# Patient Record
Sex: FEMALE | Race: WHITE
[De-identification: names, ages, dates, MRNs, and addresses within clinical notes are randomized per-mention and may not be internally consistent; named-entity substitution may affect disease eponyms.]

---

## 2020-01-26 ENCOUNTER — HOSPITAL ENCOUNTER (EMERGENCY)
Dept: HOSPITAL 97 - ER | Age: 65
Discharge: HOME | End: 2020-01-26
Payer: SELF-PAY

## 2020-01-26 VITALS — SYSTOLIC BLOOD PRESSURE: 148 MMHG | DIASTOLIC BLOOD PRESSURE: 76 MMHG | OXYGEN SATURATION: 95 %

## 2020-01-26 VITALS — TEMPERATURE: 97.8 F

## 2020-01-26 DIAGNOSIS — Z88.6: ICD-10-CM

## 2020-01-26 DIAGNOSIS — Z88.7: ICD-10-CM

## 2020-01-26 DIAGNOSIS — Z88.0: ICD-10-CM

## 2020-01-26 DIAGNOSIS — Z88.1: ICD-10-CM

## 2020-01-26 DIAGNOSIS — K08.89: Primary | ICD-10-CM

## 2020-01-26 PROCEDURE — 99283 EMERGENCY DEPT VISIT LOW MDM: CPT

## 2020-01-26 PROCEDURE — 96372 THER/PROPH/DIAG INJ SC/IM: CPT

## 2020-01-26 NOTE — EDPHYS
Physician Documentation                                                                           

 [facility]                                                                 

Name: Kina Shannon                                                                           

Age: 64 yrs                                                                                       

Sex: Female                                                                                       

: 1955                                                                                   

MRN: [id number]                                                                                   

Arrival Date: 2020                                                                          

Time: 19:11                                                                                       

Account#: [id number]                                                                            

Bed 25                                                                                            

Private MD:                                                                                       

ED Physician Endy Gillis                                                                         

HPI:                                                                                              

                                                                                             

20:16 This 64 yrs old  Female presents to ER via Ambulatory with complaints of       pm1 

      Toothache.                                                                                  

20:16 The patient presents with pain. The problem is located in the lower right first molar   pm1 

      and lower right second molar. Onset: The symptoms/episode began/occurred today.             

      Duration: The symptoms are continuous. Modifying factors: The symptoms are alleviated       

      by nothing, the symptoms are aggravated by nothing. Associated signs and symptoms:          

      Pertinent negatives: dysphagia, fever, swelling. Severity of symptoms: in the emergency     

      department the symptoms are actually worse. patient reports pain to area of bridge to       

      right lower molars. Contacted her dentist and was referred to the the ER.                   

                                                                                                  

Historical:                                                                                       

- Allergies:                                                                                      

19:31 PENICILLINS;                                                                            rv  

19:31 Codeine;                                                                                rv  

19:31 Rifampin;                                                                               rv  

19:31 Talwin;                                                                                 rv  

19:31 Tetanus Vaccines \T\ Toxoid;                                                              rv

19:31 Macrobid;                                                                               rv  

- PMHx:                                                                                           

19:31 Hypertension; Thyroid problem; High Cholesterol;                                        rv  

- PSHx:                                                                                           

19:31 ; Cholecystectomy;                                                             rv  

                                                                                                  

- Immunization history:: Adult Immunizations up to date.                                          

- Coronavirus screen:: The patient has NOT traveled to [place], [place], or [place] in the           

  past 14 days. Proceed with normal triage process as indicated. The patient has NOT              

  had contact with known/suspected case of Coronavirus? Proceed with normal triage                

  procedures.                                                                                     

- Social history:: Smoking status: Patient reports the use of cigarette tobacco                   

  products, denies chronic smoking, but will smoke occasionally.                                  

- Ebola Screening: : No symptoms or risks identified at this time.                                

                                                                                                  

                                                                                                  

ROS:                                                                                              

20:16 Constitutional: Negative for fever, chills, and weight loss, Eyes: Negative for injury, pm1 

      pain, redness, and discharge.                                                               

20:16 Neck: Negative for injury, pain, and swelling, Cardiovascular: Negative for chest pain,     

      palpitations, and edema, Respiratory: Negative for shortness of breath, cough,              

      wheezing, and pleuritic chest pain, Abdomen/GI: Negative for abdominal pain, nausea,        

      vomiting, diarrhea, and constipation, Neuro: Negative for headache, weakness, numbness,     

      tingling, and seizure.                                                                      

20:16 ENT: Positive for dental pain, Negative for ear pain, difficulty swallowing, difficulty     

      handling secretions, hoarseness.                                                            

20:16 All other systems are negative.                                                             

                                                                                                  

Exam:                                                                                             

20:16 Constitutional:  This is a well developed, well nourished patient who is awake, alert,  pm1 

      and in no acute distress. Head/Face:  Normocephalic, atraumatic. Eyes:  Pupils equal        

      round and reactive to light, extra-ocular motions intact.  Lids and lashes normal.          

      Conjunctiva and sclera are non-icteric and not injected.  Cornea within normal limits.      

      Periorbital areas with no swelling, redness, or edema.                                      

20:16 Neck:  Trachea midline, no thyromegaly or masses palpated, and no cervical                  

      lymphadenopathy.  Supple, full range of motion without nuchal rigidity, or vertebral        

      point tenderness.  No Meningismus. Chest/axilla:  Normal chest wall appearance and          

      motion.  Nontender with no deformity.  No lesions are appreciated. Cardiovascular:          

      Regular rate and rhythm with a normal S1 and S2.  No gallops, murmurs, or rubs.  Normal     

      PMI, no JVD.  No pulse deficits. Respiratory:  Lungs have equal breath sounds               

      bilaterally, clear to auscultation and percussion.  No rales, rhonchi or wheezes noted.     

       No increased work of breathing, no retractions or nasal flaring. Back:  No spinal          

      tenderness.  No costovertebral tenderness.  Full range of motion. Skin:  Warm, dry with     

      normal turgor.  Normal color with no rashes, no lesions, and no evidence of cellulitis.     

20:16 ENT: External ear(s): are unremarkable, Ear canal(s): are normal, TM's: are normal,         

      Dental exam: pain, that is moderate, specifically in the  lower right first molar (#30)     

      and lower right second molar (#31).                                                         

20:16 Neuro: Orientation: is normal, Motor: is normal, moves all fours.                           

                                                                                                  

Vital Signs:                                                                                      

19:29  / 80; Pulse 69; Resp 16; Temp 97.8; Pulse Ox 100% ; Weight 72.57 kg; Pain 10/10; rv  

21:30  / 76; Pulse 68; Resp 18; Pulse Ox 95% on R/A;                                    aj1 

                                                                                                  

MDM:                                                                                              

19:43 Patient medically screened.                                                             pm1 

21:13 Data reviewed: vital signs. Data interpreted: Pulse oximetry: on room air is 100 %.     pm1 

      Interpretation: normal. Counseling: I had a detailed discussion with the patient and/or     

      guardian regarding: the historical points, exam findings, and any diagnostic results        

      supporting the discharge/admit diagnosis, the need for outpatient follow up, for            

      definitive care, a dentist, to return to the emergency department if symptoms worsen or     

      persist or if there are any questions or concerns that arise at home.                       

21:13 ED course: Patient pain 6/10. Patient feels comfortable going home with pain relief.    pm1 

                                                                                                  

Administered Medications:                                                                         

19:52 Drug: morphine 4 mg {Note: RASS 1.} Route: IM; Site: left deltoid;                      rv  

21:29 Follow up: Response: No adverse reaction                                                aj1 

19:53 Drug: Zofran 4 mg Route: PO;                                                            rv  

21:29 Follow up: Response: No adverse reaction                                                aj1 

20:45 Drug: fentaNYL (PF) 50 mcg {Note: rass 0.} Route: IM; Site: right deltoid;              rv  

21:29 Follow up: Response: No adverse reaction; Pain is decreased; RASS: Alert and Calm (0)   aj 

                                                                                                  

                                                                                                  

Disposition:                                                                                      

21:33 Co-signature as Attending Physician, Endy Gillis MD.                                    rn  

                                                                                                  

Disposition:                                                                                      

20 21:16 Discharged to Home. Impression: Dental pain.                                       

- Condition is Stable.                                                                            

- Discharge Instructions: Dental Pain.                                                            

- Prescriptions for Clindamycin HCl 300 mg Oral Capsule - take 1 capsule by ORAL route            

  every 6 hours for 10 days; 40 capsule.                                                          

- Medication Reconciliation Form, Thank You Letter, Antibiotic Education, Prescription            

  Opioid Use form.                                                                                

- Follow up: Emergency Department; When: As needed; Reason: Worsening of condition.               

  Follow up: Private Physician; When: 2 - 3 days; Reason: Recheck today's complaints,             

  Continuance of care, Re-evaluation by your physician.                                           

- Problem is new.                                                                                 

- Symptoms have improved.                                                                         

                                                                                                  

                                                                                                  

                                                                                                  

Signatures:                                                                                       

Marleen Rutherford RN                     RN   aj1                                                  

Endy Gillis MD MD   rn                                                   

Foster Salguero, LISBETH                    NP   pm1                                                  

Avery Valenzuela, ANDREW                    RN   rv                                                   

                                                                                                  

Corrections: (The following items were deleted from the chart)                                    

21:31 21:16 2020 21:16 Discharged to Home. Impression: Dental pain. Condition is        aj1 

      Stable. Forms are Medication Reconciliation Form, Thank You Letter, Antibiotic              

      Education, Prescription Opioid Use. Follow up: Emergency Department; When: As needed;       

      Reason: Worsening of condition. Follow up: Private Physician; When: 2 - 3 days; Reason:     

      Recheck today's complaints, Continuance of care, Re-evaluation by your physician.           

      Problem is new. Symptoms have improved. pm1                                                 

                                                                                                  

**************************************************************************************************

## 2020-01-26 NOTE — ER
Nurse's Notes                                                                                     

 [facility]                                                                 

Name: Kina Shannon                                                                           

Age: 64 yrs                                                                                       

Sex: Female                                                                                       

: 1955                                                                                   

MRN: [id number]                                                                                   

Arrival Date: 2020                                                                          

Time: 19:11                                                                                       

Account#: [id number]                                                                            

Bed 25                                                                                            

Private MD:                                                                                       

Diagnosis: Dental pain                                                                            

                                                                                                  

Presentation:                                                                                     

                                                                                             

19:25 Presenting complaint: Patient states: PAIN STARTED TO GET BAD THIS AFTERNOON. SHE TOOK  rv  

      TRAMADOL, WHICH IS FOR HER ARTHRITIS, NOT HELPING WITH THE TOOTHACHE. ALSO TOOK             

      LIDOCAINE GELS. Transition of care: patient was not received from another setting of        

      care. Onset of symptoms was 2020 at 12:00. Risk Assessment: Do you want to      

      hurt yourself or someone else? Patient reports no desire to harm self or others.            

      Initial Sepsis Screen: Does the patient meet any 2 criteria? No. Patient's initial          

      sepsis screen is negative. Does the patient have a suspected source of infection? No.       

      Patient's initial sepsis screen is negative. Care prior to arrival: None.                   

19:25 Method Of Arrival: Ambulatory                                                           rv  

19:25 Acuity: NESHA 4                                                                           rv  

                                                                                                  

Historical:                                                                                       

- Allergies:                                                                                      

19:31 PENICILLINS;                                                                            rv  

19:31 Codeine;                                                                                rv  

19:31 Rifampin;                                                                               rv  

19:31 Talwin;                                                                                 rv  

19:31 Tetanus Vaccines \T\ Toxoid;                                                              rv

19:31 Macrobid;                                                                               rv  

- PMHx:                                                                                           

19:31 Hypertension; Thyroid problem; High Cholesterol;                                        rv  

- PSHx:                                                                                           

19:31 ; Cholecystectomy;                                                             rv  

                                                                                                  

- Immunization history:: Adult Immunizations up to date.                                          

- Coronavirus screen:: The patient has NOT traveled to [place], [place], or [place] in the           

  past 14 days. Proceed with normal triage process as indicated. The patient has NOT              

  had contact with known/suspected case of Coronavirus? Proceed with normal triage                

  procedures.                                                                                     

- Social history:: Smoking status: Patient reports the use of cigarette tobacco                   

  products, denies chronic smoking, but will smoke occasionally.                                  

- Ebola Screening: : No symptoms or risks identified at this time.                                

                                                                                                  

                                                                                                  

Screenin:55 Abuse screen: Denies threats or abuse. Denies injuries from another. Nutritional        aj1 

      screening: No deficits noted. Tuberculosis screening: No symptoms or risk factors           

      identified.                                                                                 

21:31 Fall Risk None identified.                                                              aj1 

                                                                                                  

Assessment:                                                                                       

20:53 Reassessment: Received patient, states that she got really nauseous after receiving     aj1 

      Fentanyl, states that she is feeling better at this time. Patient is laying in bed          

      comfortably.                                                                                

20:55 General: Appears in no apparent distress. comfortable, Behavior is calm, cooperative,   aj1 

      appropriate for age. Pain: Complains of pain in right jaw. Neuro: Level of                  

      Consciousness is awake, alert, obeys commands. Cardiovascular: Patient's skin is warm       

      and dry. Respiratory: Airway is patent Respiratory effort is even, unlabored,               

      Respiratory pattern is regular, symmetrical. GI: No signs and/or symptoms were reported     

      involving the gastrointestinal system. : No signs and/or symptoms were reported           

      regarding the genitourinary system. EENT: Reports jaw pain. Derm: No signs and/or           

      symptoms reported regarding the dermatologic system. Skin is pink, warm \T\ dry. normal.    

      Musculoskeletal: No signs and/or symptoms reported regarding the musculoskeletal            

      system. Circulation, motion, and sensation intact.                                          

21:30 Reassessment: Patient appears in no apparent distress at this time. No changes from     aj1 

      previously documented assessment. Patient and/or family updated on plan of care and         

      expected duration. Pain level reassessed. Patient is alert, oriented x 3, equal             

      unlabored respirations, skin warm/dry/pink.                                                 

                                                                                                  

Vital Signs:                                                                                      

19:29  / 80; Pulse 69; Resp 16; Temp 97.8; Pulse Ox 100% ; Weight 72.57 kg; Pain 10/10; rv  

21:30  / 76; Pulse 68; Resp 18; Pulse Ox 95% on R/A;                                    aj1 

                                                                                                  

ED Course:                                                                                        

19:11 Patient arrived in ED.                                                                  as  

19:29 Triage completed.                                                                       rv  

19:31 Arm band placed on.                                                                     rv  

19:36 Foster aSlguero NP is PHCP.                                                           pm1 

19:36 Endy Gillis MD is Attending Physician.                                                pm1 

19:52 Avery Valenzuela RN is Primary Nurse.                                                  rv  

20:55 Patient has correct armband on for positive identification. Bed in low position. Call   aj1 

      light in reach. Side rails up X 1.                                                          

20:55 No provider procedures requiring assistance completed.                                  aj1 

21:30 Patient did not have IV access during this emergency room visit.                        aj1 

                                                                                                  

Administered Medications:                                                                         

19:52 Drug: morphine 4 mg {Note: RASS 1.} Route: IM; Site: left deltoid;                      rv  

21:29 Follow up: Response: No adverse reaction                                                aj1 

19:53 Drug: Zofran 4 mg Route: PO;                                                            rv  

21:29 Follow up: Response: No adverse reaction                                                aj1 

20:45 Drug: fentaNYL (PF) 50 mcg {Note: rass 0.} Route: IM; Site: right deltoid;              rv  

21:29 Follow up: Response: No adverse reaction; Pain is decreased; RASS: Alert and Calm (0)   aj1 

                                                                                                  

                                                                                                  

Outcome:                                                                                          

21:16 Discharge ordered by MD.                                                                pm1 

21:30 Discharged to home ambulatory, with family.                                             aj1 

21:30 Condition: good                                                                             

21:30 Discharge instructions given to patient, Instructed on discharge instructions, follow       

      up and referral plans. medication usage, Demonstrated understanding of instructions,        

      follow-up care, medications, Prescriptions given X 1.                                       

21:31 Patient left the ED.                                                                    aj1 

                                                                                                  

Signatures:                                                                                       

Marleen Rutherford, RN                     RN   aj1                                                  

Nikki Salazar Patrick, LISBETH                    NP   pm1                                                  

Avery Valenzuela RN                    RN   rv                                                   

                                                                                                  

**************************************************************************************************

## 2022-08-04 ENCOUNTER — HOSPITAL ENCOUNTER (EMERGENCY)
Dept: HOSPITAL 97 - ER | Age: 67
Discharge: HOME | End: 2022-08-04
Payer: COMMERCIAL

## 2022-08-04 VITALS — DIASTOLIC BLOOD PRESSURE: 82 MMHG | SYSTOLIC BLOOD PRESSURE: 184 MMHG

## 2022-08-04 VITALS — TEMPERATURE: 98.3 F

## 2022-08-04 VITALS — OXYGEN SATURATION: 97 %

## 2022-08-04 DIAGNOSIS — M54.2: ICD-10-CM

## 2022-08-04 DIAGNOSIS — E78.00: ICD-10-CM

## 2022-08-04 DIAGNOSIS — F17.210: ICD-10-CM

## 2022-08-04 DIAGNOSIS — I10: ICD-10-CM

## 2022-08-04 DIAGNOSIS — Z88.7: ICD-10-CM

## 2022-08-04 DIAGNOSIS — Z88.1: ICD-10-CM

## 2022-08-04 DIAGNOSIS — M25.512: Primary | ICD-10-CM

## 2022-08-04 DIAGNOSIS — Z88.5: ICD-10-CM

## 2022-08-04 LAB
BUN BLD-MCNC: 13 MG/DL (ref 7–18)
GLUCOSE SERPLBLD-MCNC: 101 MG/DL (ref 74–106)
HCT VFR BLD CALC: 41.7 % (ref 36–45)
LYMPHOCYTES # SPEC AUTO: 2.8 K/UL (ref 0.7–4.9)
MCV RBC: 80.6 FL (ref 80–100)
PMV BLD: 7.5 FL (ref 7.6–11.3)
POTASSIUM SERPL-SCNC: 3.6 MMOL/L (ref 3.5–5.1)
RBC # BLD: 5.17 M/UL (ref 3.86–4.86)

## 2022-08-04 PROCEDURE — 73030 X-RAY EXAM OF SHOULDER: CPT

## 2022-08-04 PROCEDURE — 36415 COLL VENOUS BLD VENIPUNCTURE: CPT

## 2022-08-04 PROCEDURE — 71260 CT THORAX DX C+: CPT

## 2022-08-04 PROCEDURE — 85025 COMPLETE CBC W/AUTO DIFF WBC: CPT

## 2022-08-04 PROCEDURE — 96375 TX/PRO/DX INJ NEW DRUG ADDON: CPT

## 2022-08-04 PROCEDURE — 74177 CT ABD & PELVIS W/CONTRAST: CPT

## 2022-08-04 PROCEDURE — 80048 BASIC METABOLIC PNL TOTAL CA: CPT

## 2022-08-04 PROCEDURE — 96374 THER/PROPH/DIAG INJ IV PUSH: CPT

## 2022-08-04 PROCEDURE — 72125 CT NECK SPINE W/O DYE: CPT

## 2022-08-04 PROCEDURE — 99284 EMERGENCY DEPT VISIT MOD MDM: CPT

## 2022-08-04 PROCEDURE — 70450 CT HEAD/BRAIN W/O DYE: CPT

## 2022-08-04 NOTE — EDPHYS
Physician Documentation                                                                           

 [facility]                                                                 

Name: Kina Shannon                                                                           

Age: 66 yrs                                                                                       

Sex: Female                                                                                       

: 1955                                                                                   

MRN: [id number]                                                                                   

Arrival Date: 2022                                                                          

Time: 06:25                                                                                       

Account#: [id number]                                                                            

Bed 8                                                                                             

Private MD:                                                                                       

ED Physician Say Leggett                                                                       

HPI:                                                                                              

                                                                                             

06:52 This 66 yrs old Female presents to ER via Wheelchair with complaints of Fall Injury.    rn  

06:52 Details of fall: The patient fell from an upright position, while standing. Onset: The  rn  

      symptoms/episode began/occurred just prior to arrival. Associated injuries: The patient     

      sustained injury to the head, neck injury, upper back injury, injury to the low back,       

      injury to the chest, injury to the abdomen. Severity of symptoms: At their worst the        

      symptoms were moderate, in the emergency department the symptoms are unchanged. The         

      patient has not experienced similar symptoms in the past. The patient has not recently      

      seen a physician. Remembers all events, no LOC. Takes aspirin only.                         

                                                                                                  

Historical:                                                                                       

- Allergies:                                                                                      

06:41 Codeine;                                                                                bb  

06:41 Macrobid;                                                                               bb  

06:41 PENICILLINS;                                                                            bb  

06:41 Rifampin;                                                                               bb  

06:41 Talwin;                                                                                 bb  

06:41 Tetanus Vaccines \T\ Toxoid;                                                              bb

- Home Meds:                                                                                      

06:41 valsartan oral [Active]; levothyroxine oral [Active]; Cymbalta oral [Active]; Bystolic  bb  

      oral [Active]; doxazosin oral [Active];                                                     

- PMHx:                                                                                           

06:41 High Cholesterol; Hypertension; Thyroid problem;                                        bb  

                                                                                                  

- Immunization history: Last tetanus immunization: unknown.                                       

- Social history:: Smoking status: Patient reports the use of cigarette tobacco                   

  products.                                                                                       

- Family history:: not pertinent.                                                                 

- Hospitalizations: : No recent hospitalization is reported.                                      

                                                                                                  

                                                                                                  

ROS:                                                                                              

06:52 Constitutional: Negative for fever, chills, and weight loss, Eyes: Negative for injury, rn  

      pain, redness, and discharge, Neck: + neck pain Cardiovascular: Negative for                

      palpitations, and edema, Respiratory: Negative for shortness of breath, cough, wheezing     

      Abdomen/GI: Negative for abdominal pain, nausea, vomiting, diarrhea, and constipation,      

      Back: + back pain MS/Extremity: + left shoulder pain and injury Skin: Negative for          

      injury, rash, and discoloration, Neuro: Negative for weakness, numbness, tingling, and      

      seizure.                                                                                    

                                                                                                  

Exam:                                                                                             

06:52 Constitutional:  This is a well developed, well nourished patient who is awake, alert,  rn  

      and in no acute distress. Head/Face:  Normocephalic, atraumatic. Eyes:  Periorbital         

      areas with no swelling, redness, or edema. Cardiovascular:  Regular rate and rhythm.        

      No pulse deficits. Respiratory:  Speaking full sentences.  No increased work of             

      breathing, no retractions or nasal flaring. Abdomen/GI:  soft, mild epigastric              

      tenderenss Back:  + tenderness in cervical and thoracic spinal region MS/ Extremity:        

      Pulses equal, no cyanosis.  Neurovascular intact.  Full, normal range of motion of both     

      hips and RUE. Mild painful ROM left shoulder.  Neuro:  Awake and alert, GCS 15,             

      oriented to person, place, time, and situation.  Cranial nerves II-XII grossly intact.      

      Motor strength 5/5 in all extremities.  Sensory grossly intact.                             

                                                                                                  

Vital Signs:                                                                                      

06:38  / 89; Pulse 69; Resp 16 S; Temp 98.3(O); Pulse Ox 96% on R/A; Weight 75.3 kg     bb  

      (R); Height 5 ft. 1 in. (154.94 cm) (R); Pain 9/10;                                         

06:48  / 106; Pulse 71; Resp 18; Pulse Ox 93% on R/A;                                   tw5 

07:21  / 86;                                                                            ll1 

08:18  / 80; Pulse 71; Resp 17; Pulse Ox 97% on R/A;                                    ll1 

08:54  / 84;                                                                            ph  

10:12  / 82; Pulse 70; Resp 16; Pulse Ox 97% on R/A;                                    ll1 

06:38 Body Mass Index 31.37 (75.30 kg, 154.94 cm)                                             bb  

10:12 Dr. Leggett notified of BP still elevated. Still cleared for discharge. Will take her   ll1 

      BP meds when she gets home.                                                                 

                                                                                                  

Christine Coma Score:                                                                               

06:38 Eye Response: spontaneous(4). Verbal Response: oriented(5). Motor Response: obeys       bb  

      commands(6). Total: 15.                                                                     

                                                                                                  

Trauma Score (Adult):                                                                             

06:38 Eye Response: spontaneous(1); Verbal Response: oriented(1); Motor Response: obeys       bb  

      commands(2); Systolic BP: > 89 mm Hg(4); Respiratory Rate: 10 to 29 per min(4); Christine     

      Score: 15; Trauma Score: 12                                                                 

                                                                                                  

MDM:                                                                                              

06:26 Patient medically screened.                                                             rn  

09:41 Data reviewed: vital signs, nurses notes, lab test result(s), radiologic studies.       kdr 

      Counseling: I had a detailed discussion with the patient and/or guardian regarding: the     

      historical points, exam findings, and any diagnostic results supporting the                 

      discharge/admit diagnosis, lab results, radiology results, the need for outpatient          

      follow up.                                                                                  

                                                                                                  

                                                                                             

06:51 Order name: Basic Metabolic Panel; Complete Time: 07:37                                 rn  

                                                                                             

06:51 Order name: CBC with Diff; Complete Time: 07:37                                         rn  

                                                                                             

06:52 Order name: XRAY Shoulder LEFT 2 view; Complete Time: 09:27                             rn  

                                                                                             

07:34 Order name: Head C Spine Cap W Con; Complete Time: 09:27                                EDMS

                                                                                             

06:51 Order name: Labs collected and sent; Complete Time: 06:55                               rn  

                                                                                                  

Administered Medications:                                                                         

07:21 Drug: fentaNYL (PF) 25 mcg {Note: RASS 0, PAIN 9/10.} Route: IVP; Site: right           ll1 

      antecubital;                                                                                

08:19 Follow up: Response: No adverse reaction; Pain is decreased; RASS: Alert and Calm (0)   ll1 

08:25 Drug: fentaNYL (PF) 25 mcg {Note: rass 0, pain 9/10.} Route: IVP; Site: right           ll1 

      antecubital;                                                                                

10:17 Follow up: Response: No adverse reaction                                                ll1 

10:17 Follow up: Response: No adverse reaction; Pain is decreased; RASS: Alert and Calm (0)   ll1 

08:50 Drug: Benadryl (diphenhydrAMINE) 12.5 mg Route: IVP; Site: right antecubital;           ph  

10:17 Follow up: Response: No adverse reaction; RASS: Alert and Calm (0)                      ll1 

09:52 Drug: Norco (HYDROcodone-acetaminophen) (7.5 mg-325 mg) 1 tabs {Note: rass 0, pain      kr3 

      7/10.} Route: PO;                                                                           

10:18 Follow up: Response: No adverse reaction                                                ll1 

09:52 Drug: Flexeril (cyclobenzaprine) 10 mg Route: PO;                                       kr3 

10:18 Follow up: Response: No adverse reaction; RASS: Alert and Calm (0)                      ll1 

                                                                                                  

                                                                                                  

Disposition Summary:                                                                              

22 09:43                                                                                    

Discharge Ordered                                                                                 

      Location: Home                                                                          kdr 

      Problem: new                                                                            kdr 

      Symptoms: have improved                                                                 kdr 

      Condition: Stable                                                                       kdr 

      Diagnosis                                                                                   

        - Pain in unspecified shoulder                                                        kdr 

      Followup:                                                                               kdr 

        - With: Private Physician                                                                  

        - When: 2 - 3 days                                                                         

        - Reason: If symptoms return, Further diagnostic work-up, Recheck today's complaints,      

      Continuance of care, Re-evaluation by your physician                                        

      Discharge Instructions:                                                                     

        - Discharge Summary Sheet                                                             kdr 

        - Joint Pain                                                                          kdr 

        - Musculoskeletal Pain                                                                kdr 

        - Shoulder Range of Motion Exercises                                                  kdr 

        - Shoulder Pain, Easy-to-Read                                                         kdr 

      Forms:                                                                                      

        - Medication Reconciliation Form                                                      kdr 

        - Thank You Letter                                                                    kdr 

        - Prescription Opioid Use                                                             kdr 

        - Family Work Release                                                                 kr3 

      Prescriptions:                                                                              

        - Cyclobenzaprine 10 mg Oral Tablet                                                        

            - take 1 tablet by ORAL route every 8 hours As needed; 6 tablet; Refills: 0,      kdr 

      Product Selection Permitted                                                                 

        - Tylenol-Codeine #3 300 mg-30 mg Oral                                                     

            - take 1 tablet by ORAL route every 4-6 hours As needed; 6 tablet; Refills: 0,    kdr 

      Product Selection Permitted                                                                 

Signatures:                                                                                       

Dispatcher [employer]                           EDMS                                                 

Say Leggett MD MD   kdr                                                  

Donna Mehta, RN                     RN   bb                                                   

Endy Gillis MD MD rn Hall, Patricia, RN                      RN   ph                                                   

Pratima Brown RN                       RN   ll1                                                  

hCerry Dennis RN                        RN   kr3                                                  

                                                                                                  

Corrections: (The following items were deleted from the chart)                                    

09:02 06:52 TYPE AND SCREEN+[id number].[id number] ordered. EDMS                                          EDMS

                                                                                                  

**************************************************************************************************

## 2022-08-04 NOTE — ER
Nurse's Notes                                                                                     

 [facility]                                                                 

Name: Kina Shannon                                                                           

Age: 66 yrs                                                                                       

Sex: Female                                                                                       

: 1955                                                                                   

MRN: [id number]                                                                                   

Arrival Date: 2022                                                                          

Time: 06:25                                                                                       

Account#: [id number]                                                                            

Bed 8                                                                                             

Private MD:                                                                                       

Diagnosis: Pain in unspecified shoulder                                                           

                                                                                                  

Presentation:                                                                                     

                                                                                             

06:38 Chief complaint: Patient states: she fell over her dog toy about an hour PTA falling    bb  

      backwards hitting her head but denies LOC now c/o of back pain, neck pain, left arm and     

      shoulder pain. Care prior to arrival: None. Mechanism of Injury: Fall. Trauma event         

      details: Injury occurred in the [facility], Injury occurred: at home. Injury        

      occurred: 2022.                                                                  

06:38 Acuity: NESHA 3                                                                           bb  

06:38 Method Of Arrival: Wheelchair                                                           bb  

06:41 Coronavirus screen: At this time, the client does not indicate any symptoms associated  bb  

      with coronavirus-19. Ebola Screen: No symptoms or risks identified at this time.            

      Initial Sepsis Screen: Does the patient meet any 2 criteria? No. Patient's initial          

      sepsis screen is negative. Does the patient have a suspected source of infection? No.       

      Patient's initial sepsis screen is negative. Risk Assessment: Do you want to hurt           

      yourself or someone else? Patient reports no desire to harm self or others. Onset of        

      symptoms was 2022.                                                               

                                                                                                  

Triage Assessment:                                                                                

06:48 General: Appears uncomfortable, Behavior is anxious. General: Reports "I fell so I am a tw5 

      little upset.". Pain: Complains of pain in back, chest and anterior aspect of left          

      shoulder Pain currently is 9 out of 10 on a pain scale. Neuro: Level of Consciousness       

      is awake, alert, obeys commands. Respiratory: Airway is patent Trachea midline              

      Respiratory effort is even, unlabored.                                                      

                                                                                                  

Trauma Activation: Not Applicable                                                                 

 Physician: ED Physician; Name: ; Notified At: ; Arrived At:                                      

 Physician: General Surgeon; Name: ; Notified At: ; Arrived At:                                   

 Physician: Radiology; Name: ; Notified At: ; Arrived At:                                         

 Physician: Respiratory; Name: ; Notified At: ; Arrived At:                                       

 Physician: Lab; Name: ; Notified At: ; Arrived At:                                               

                                                                                                  

Historical:                                                                                       

- Allergies:                                                                                      

06:41 Codeine;                                                                                bb  

06:41 Macrobid;                                                                               bb  

06:41 PENICILLINS;                                                                            bb  

06:41 Rifampin;                                                                               bb  

06:41 Talwin;                                                                                 bb  

06:41 Tetanus Vaccines \T\ Toxoid;                                                              bb

- Home Meds:                                                                                      

06:41 valsartan oral [Active]; levothyroxine oral [Active]; Cymbalta oral [Active]; Bystolic  bb  

      oral [Active]; doxazosin oral [Active];                                                     

- PMHx:                                                                                           

06:41 High Cholesterol; Hypertension; Thyroid problem;                                        bb  

                                                                                                  

- Immunization history: Last tetanus immunization: unknown.                                       

- Social history:: Smoking status: Patient reports the use of cigarette tobacco                   

  products.                                                                                       

- Family history:: not pertinent.                                                                 

- Hospitalizations: : No recent hospitalization is reported.                                      

                                                                                                  

                                                                                                  

Screenin:38 Abuse screen: Denies threats or abuse. Tuberculosis screening: No symptoms or risk      bb  

      factors identified.                                                                         

06:52 Nutritional screening: No deficits noted. Fall Risk Fall in past 12 months (25 points). tw5 

                                                                                                  

Primary Survey:                                                                                   

06:38 NO uncontrolled hemorrhage observed. A: The client is awake and alert. The airway is    bb  

      patent. Breathing/Chest: Spontaneous respiratory effort, equal unlabored respirations,      

      breath sounds clear bilaterally, regular pattern, symmetrical chest rise and fall.          

      Circulation: No external hemorrhage present. Regular and strong central pulse, skin         

      warm/dry/normal color. Disability Client is alert.                                          

06:54 Exposure/Environment: There is no evidence of uncontrolled external bleeding.           tw5 

      Reassessment Breathing: Spontaneous respiratory effort, equal unlabored respirations,       

      breath sounds clear bilaterally, regular pattern with symmetrical chest rise and fall.      

                                                                                                  

Assessment:                                                                                       

06:52 General: Reports "I fell hit my shoulder on the table in front of the couch and rolled  tw5 

      over and fell the rest of the way on the floor. I landed on my back.". Neuro: Level of      

      Consciousness is awake, alert, obeys commands, Oriented to person, place, time,             

      situation. Respiratory: No deficits noted.                                                  

07:00 Reassessment: No changes from previously documented assessment. REPORT RECEIVED FROM    ll1 

      NIGHT SHIFT RN.                                                                             

07:22 Reassessment: No changes from previously documented assessment. Patient and/or family   ll1 

      updated on plan of care and expected duration. Pain level reassessed. Patient is alert,     

      oriented x 3, equal unlabored respirations, skin warm/dry/pink.                             

08:15 Reassessment: No changes from previously documented assessment. Patient and/or family   ll1 

      updated on plan of care and expected duration. Pain level reassessed. Patient is alert,     

      oriented x 3, equal unlabored respirations, skin warm/dry/pink.                             

09:15 Reassessment: No changes from previously documented assessment. Patient and/or family   ll1 

      updated on plan of care and expected duration. Pain level reassessed. Patient is alert,     

      oriented x 3, equal unlabored respirations, skin warm/dry/pink.                             

10:11 Reassessment: No changes from previously documented assessment. Patient and/or family   ll1 

      updated on plan of care and expected duration. Pain level reassessed. Patient is alert,     

      oriented x 3, equal unlabored respirations, skin warm/dry/pink.                             

                                                                                                  

Vital Signs:                                                                                      

06:38  / 89; Pulse 69; Resp 16 S; Temp 98.3(O); Pulse Ox 96% on R/A; Weight 75.3 kg     bb  

      (R); Height 5 ft. 1 in. (154.94 cm) (R); Pain 9/10;                                         

06:48  / 106; Pulse 71; Resp 18; Pulse Ox 93% on R/A;                                   tw5 

07:21  / 86;                                                                            ll1 

08:18  / 80; Pulse 71; Resp 17; Pulse Ox 97% on R/A;                                    ll1 

08:54  / 84;                                                                            ph  

10:12  / 82; Pulse 70; Resp 16; Pulse Ox 97% on R/A;                                    ll1 

06:38 Body Mass Index 31.37 (75.30 kg, 154.94 cm)                                             bb  

10:12 Dr. Leggett notified of BP still elevated. Still cleared for discharge. Will take her   ll1 

      BP meds when she gets home.                                                                 

                                                                                                  

Liban Coma Score:                                                                               

06:38 Eye Response: spontaneous(4). Verbal Response: oriented(5). Motor Response: obeys       bb  

      commands(6). Total: 15.                                                                     

                                                                                                  

Trauma Score (Adult):                                                                             

06:38 Eye Response: spontaneous(1); Verbal Response: oriented(1); Motor Response: obeys       bb  

      commands(2); Systolic BP: > 89 mm Hg(4); Respiratory Rate: 10 to 29 per min(4); Liban     

      Score: 15; Trauma Score: 12                                                                 

                                                                                                  

ED Course:                                                                                        

06:25 Patient arrived in ED.                                                                  ja2 

06:26 Endy Gillis MD is Attending Physician.                                                rn  

06:38 Patient has correct armband on for positive identification. Call light in reach. Adult  bb  

      w/ patient.                                                                                 

06:38 Patient maintains SpO2 saturation greater than 95% on room air.                         bb  

06:39 Triage completed.                                                                       bb  

06:41 Arm band placed on Patient placed in an exam room, on pulse oximetry. Family            bb  

      accompanied patient.                                                                        

06:52 Awaiting lab results.                                                                   tw5 

06:52 Pulse ox on. NIBP on. Door closed.                                                      tw5 

06:54 Initial lab(s) drawn, by ED staff, sent to lab. Inserted saline lock: 20 gauge in right tw5 

      antecubital area, using aseptic technique. Blood collected. Started by Anabell MORALES.          

06:55 Basic Metabolic Panel Sent.                                                             tw5 

06:55 CBC with Diff Sent.                                                                     tw5 

07:05 Thermoregulation: warm blanket given to patient.                                        ll1 

07:07 Basic Metabolic Panel Sent.                                                             tw5 

07:08 CBC with Diff Sent.                                                                     tw5 

07:12 Pratima Brown RN is Primary Nurse.                                                     ll1 

07:25 Attending Physician role handed off by Endy Gillis MD                                 kdr 

07:25 Say Leggett MD is Attending Physician.                                              kdr 

07:27 XRAY Shoulder LEFT 2 view In Process Unspecified.                                       EDMS

07:56 Primary Nurse role handed off by Pratima Brown RN                                      eb  

08:02 Head C Spine Cap W Con In Process Unspecified.                                          EDMS

08:18 Pratiam Brown RN is Primary Nurse.                                                     ll1 

10:11 No provider procedures requiring assistance completed. IV discontinued, intact,         ll1 

      bleeding controlled, No redness/swelling at site. Pressure dressing applied.                

                                                                                                  

Administered Medications:                                                                         

07:21 Drug: fentaNYL (PF) 25 mcg {Note: RASS 0, PAIN 9/10.} Route: IVP; Site: right           ll1 

      antecubital;                                                                                

08:19 Follow up: Response: No adverse reaction; Pain is decreased; RASS: Alert and Calm (0)   ll1 

08:25 Drug: fentaNYL (PF) 25 mcg {Note: rass 0, pain 9/10.} Route: IVP; Site: right           ll1 

      antecubital;                                                                                

10:17 Follow up: Response: No adverse reaction                                                ll1 

10:17 Follow up: Response: No adverse reaction; Pain is decreased; RASS: Alert and Calm (0)   ll1 

08:50 Drug: Benadryl (diphenhydrAMINE) 12.5 mg Route: IVP; Site: right antecubital;           ph  

10:17 Follow up: Response: No adverse reaction; RASS: Alert and Calm (0)                      ll1 

09:52 Drug: Norco (HYDROcodone-acetaminophen) (7.5 mg-325 mg) 1 tabs {Note: rass 0, pain      kr3 

      7/10.} Route: PO;                                                                           

10:18 Follow up: Response: No adverse reaction                                                ll1 

09:52 Drug: Flexeril (cyclobenzaprine) 10 mg Route: PO;                                       kr3 

10:18 Follow up: Response: No adverse reaction; RASS: Alert and Calm (0)                      ll1 

                                                                                                  

                                                                                                  

Medication:                                                                                       

06:52 VIS not applicable for this client.                                                     tw5 

                                                                                                  

Intake:                                                                                           

06:38 PO: 0ml; Total: 0ml.                                                                    bb  

                                                                                                  

Outcome:                                                                                          

09:43 Discharge ordered by MD.                                                                kdr 

10:15 Discharged to home via wheelchair.                                                      ll1 

10:15 Condition: stable                                                                           

10:15 Discharge instructions given to patient, family, Instructed on discharge instructions,      

      follow up and referral plans. no drinking with medication, no driving heavy equipment,      

      medication usage, Demonstrated understanding of instructions, follow-up care,               

      medications, Prescriptions given X 2.                                                       

10:18 Patient's length of stay was not longer than 2 hours.                                   ll1 

10:19 Patient left the ED.                                                                    1 

                                                                                                  

Signatures:                                                                                       

Dispatcher [employer]                           EDMS                                                 

Say Leggett MD MD kdr Ballard, Brenda RN                     RN   bb                                                   

Endy Gillis MD MD rn Hall, Patricia, RN RN ph Botello, Elizabeth eb Lewis, Lynsay RN                       RN   ll1                                                  

Maryjane Galeano Tiffany                                tw5                                                  

Cherry Dennis RN                        RN   kr3                                                  

                                                                                                  

**************************************************************************************************

## 2024-03-16 NOTE — RAD REPORT
EXAM DESCRIPTION:  CT - Head C  Spine Cap W Con - 8/4/2022 8:00 am

 

CLINICAL HISTORY:  Trauma, head and neck injury.  Chest, abdomen and pelvis pain.

fall, head injury, neck, back chest and abd pain

 

COMPARISON:  No comparisons

 

TECHNIQUE:  CT head without contrast.

 

CT cervical spine without contrast with coronal and sagittal reformatted images.

 

CT chest, abdomen and pelvis with IV contrast (approximately 100 mL nonionic IV contrast) with corona
l and sagittal reformatted images of the spine.

 

All CT scans are performed using dose optimization technique as appropriate and may include automated
 exposure control or mA/KV adjustment according to patient size.

 

FINDINGS:  CT HEAD WITHOUT CONTRAST:

 

No intracranial hemorrhage, hydrocephalus or extra-axial fluid collection.  No areas of brain edema o
r midline shift.

 

The paranasal sinuses and mastoids are clear. The calvarium is intact.

 

 

CT CERVICAL SPINE WITHOUT CONTRAST:

 

No fracture or subluxation.  The prevertebral soft tissues are normal in thickness.

 

 

CT CHEST, ABDOMEN, PELVIS WITH CONTRAST:

 

The lungs are clear.Enlarged thyroid gland.No pneumothorax or pericardial/pleural fluid.

 

No evidence of intra-abdominal visceral injury, free fluid or free air. Cholecystectomy clips.

 

No concerning pelvic findings. Lumbar degenerative changes are present.

 

No fractures.

 

IMPRESSION:  Negative for acute traumatic findings.
EXAM DESCRIPTION:  RAD - Shoulder  Left 2 View - 8/4/2022 7:26 am

 

CLINICAL HISTORY:  PAIN

 

COMPARISON:  No comparisons

 

FINDINGS:  Mild AC joint and glenohumeral joint arthritic changes are present. No fracture or disloca
tion is seen.
"pressure like pain" RUQ/RLQ abd pain ; pt reports walking makes pain worse "and I feel short of breath" . hx P.E.  in 2021